# Patient Record
Sex: FEMALE | Race: WHITE | URBAN - METROPOLITAN AREA
[De-identification: names, ages, dates, MRNs, and addresses within clinical notes are randomized per-mention and may not be internally consistent; named-entity substitution may affect disease eponyms.]

---

## 2019-02-21 ENCOUNTER — HISTORICAL (OUTPATIENT)
Dept: ENDOSCOPY | Facility: HOSPITAL | Age: 79
End: 2019-02-21

## 2019-03-18 ENCOUNTER — HISTORICAL (OUTPATIENT)
Dept: ENDOSCOPY | Facility: HOSPITAL | Age: 79
End: 2019-03-18

## 2022-04-30 NOTE — H&P
Patient:   Drea Jean            MRN: [id number]            FIN: [id number]               Age:   78 years     Sex:  Female     :  1940   Associated Diagnoses:   None   Author:   Alexander Martinez MD      Basic Information   Source of history:  Self.       Chief Complaint   78-year-old female with intermittent solid food dysphagia is referred for an EGD.  She had recent EGD roughly 4-6 weeks ago with a tight distal stricture that was tortuous the scope passage.  We elected to dilate her with a Smith dilator 32-34-36 French all with moderate resistance a moderate mucosal tear.  She reports intermediate improvement but is still fairly symptomatic she is back today for repeat dilatation.  She states she's been off her Plavix since last Saturday.      Review of Systems   Constitutional:  Negative.    Cardiovascular:  gerd/dysphagia.       Health Status   Allergies:    Allergic Reactions (Selected)  Severity Not Documented  Lortab- Hives.,    Allergies (1) Active Reaction  Lortab Hives   Current medications:  (Selected)   Documented Medications  Documented  Pantoprazole 40 mg ORAL EC-Tablet: 40 mg = 1 tab(s), Oral, BID, # 60 tab(s), 0 Refill(s)  Plavix 75 mg oral tablet: 75 mg = 1 tab(s), Oral, Daily, # 30 tab(s), 0 Refill(s)  Pravastatin 80 mg Oral Tab: 80 mg = 1 tab(s), Oral, Daily, # 30 tab(s), 0 Refill(s)  Zofran 4 mg oral tablet: 4 mg = 1 tab(s), Oral, q8hr, PRN PRN as needed for nausea/vomiting, 0 Refill(s)  amlodipine 5 mg oral tablet: 5 mg = 1 tab(s), Oral, Daily, # 30 tab(s), 0 Refill(s)  aspirin 81 mg oral Delayed Release (EC) tablet: 81 mg = 1 tab(s), Oral, Daily, # 30 tab(s), 0 Refill(s)  escitalopram 10 mg oral tablet: 10 mg = 1 tab(s), Oral, Daily, # 30 tab(s), 0 Refill(s)  oxyCODONE 15 mg oral tablet: 15 mg = 1 tab(s), Oral, q6hr, PRN PRN for pain, 0 Refill(s)   Problem list:    All Problems  Carotid artery stenosis / SNOMED CT 927020101 / Confirmed  Chronic back pain / SNOMED CT  625326744 / Confirmed  COPD - Chronic obstructive pulmonary disease / SNOMED CT 142810035 / Confirmed  Dysphagia / SNOMED CT 98657037 / Confirmed  GERD - Gastro-esophageal reflux disease / SNOMED CT 0928131797 / Confirmed  Heart disease / SNOMED CT 60627304 / Confirmed  Hypercholesterolaemia / SNOMED CT 594394402 / Confirmed,    Active Problems (7)  Carotid artery stenosis   Chronic back pain   COPD - Chronic obstructive pulmonary disease   Dysphagia   GERD - Gastro-esophageal reflux disease   Heart disease   Hypercholesterolaemia       Histories   Past Medical History:    No active or resolved past medical history items have been selected or recorded.   Family History:    No family history items have been selected or recorded.   Procedure history:    Biopsy Gastrointestinal on 2/21/2019 at 78 Years.  Comments:  2/21/2019 08:Amber Hollis RN  auto-populated from documented surgical case  Bougie Dilatation on 2/21/2019 at 78 Years.  Comments:  2/21/2019 08:Amber Hollis RN  auto-populated from documented surgical case  Esophagogastroduodenoscopy on 2/21/2019 at 78 Years.  Comments:  2/21/2019 08:Amber Hollis RN  auto-populated from documented surgical case  Back fusion ([id number]).  Stent ([id number]).  Comments:  2/21/2019 07:Amber Stone RN  Cardiac Stent  Hysterectomy ([id number]).   Social History        Social & Psychosocial Habits    Tobacco  02/21/2019  Use: 10 or more cigarettes (1/    Patient Wants Consult For Cessation Counseling No.        Physical Examination   General:  Alert and oriented, No acute distress.    Eye:  Pupils are equal, round and reactive to light, Extraocular movements are intact, Normal conjunctiva.    HENT:  Normocephalic, Normal hearing, No pharyngeal erythema.    Neck:  Supple, Non-tender, No lymphadenopathy.    Respiratory:  Lungs are clear to auscultation, Respirations are non-labored, Breath sounds are equal.     Cardiovascular:  Normal rate, Regular rhythm, No murmur.    Gastrointestinal:  Soft, Non-tender, Non-distended, Normal bowel sounds, No organomegaly.       No qualifying data available   Lymphatics:  No lymphadenopathy neck, axilla, groin.    Musculoskeletal:  Normal range of motion, Normal strength, No tenderness, Normal gait.    Integumentary:  Warm, Moist, No rash.    Neurologic:  Alert, Oriented, Normal sensory, Normal motor function, No focal deficits.    Psychiatric:  Cooperative, Appropriate mood & affect.       Health Maintenance      Health Maintenance     Pending (in the next year)        Due            ADL Screening due  03/18/19  and every 1  year(s)           Advance Directive due  03/18/19  and every 1  year(s)           Bone Density Screening due  03/18/19  Variable frequency           COPD Maintenance-Pulmonary Rehab Education due  03/18/19  and every 1  year(s)           COPD Management-COPD Medications Prescribed due  03/18/19  and every 1  year(s)           Cognitive Screening due  03/18/19  and every 1  year(s)           Functional Assessment due  03/18/19  and every 1  year(s)           Geriatric Depression Screening due  03/18/19  and every 1  year(s)           Obesity Screening due  03/18/19  and every 1  year(s)           Pneumococcal Vaccine due  03/18/19  Variable frequency           Smoking Cessation due  03/18/19  Variable frequency           Tetanus Vaccine due  03/18/19  and every 10  year(s)           Zoster Vaccine due  03/18/19  and every 100  year(s)        Due In Future            Fall Risk Assessment not due until  02/21/20  and every 1  year(s)           Aspirin Therapy for CVD Prevention not due until  02/21/20  and every 1  year(s)           COPD Management-Oxygen Assessment not due until  02/21/20  and every 1  year(s)     Satisfied (in the past 1 year)        Satisfied            Aspirin Therapy for CVD Prevention on  02/21/19.           Blood Pressure Screening on   02/21/19.  Satisfied by Edwardo To RN.           COPD Management-Oxygen Assessment on  02/21/19.  Satisfied by Edwardo To RN.           Fall Risk Assessment on  02/21/19.  Satisfied by Amber Lovell RN        Review / Management   Results review:     No qualifying data available.       Impression and Plan   plan for egd with dilation

## 2022-04-30 NOTE — H&P
Patient:   Drea Jean            MRN: [id number]            FIN: [id number]               Age:   78 years     Sex:  Female     :  1940   Associated Diagnoses:   None   Author:   Alexander Martniez MD      Basic Information   Source of history:  Self, Medical record.       Chief Complaint   Extremities 8-year-old female long history of dysphagia has been happening with increasing frequency associated with solid foods.  She has had some weight loss because of decreased by mouth intake.  She's been dilated twice in the past and appears by Dr. wright.  She denies any hematemesis, melena, diarrhea or blood in the stool.  She does admit to some heartburn.  She has been supplementing her caloric intake with ensure but has lost a fair middleweight.  She is on aspirin and Plavix which she states she did not take today.      Review of Systems   Constitutional:  Decreased activity.    Eye:  Negative.    Ear/Nose/Mouth/Throat:  Negative.    Respiratory:  Negative.    Cardiovascular:  Negative.    Gastrointestinal:  Nausea, Vomiting, Dysphagia.    Genitourinary:  Negative.    Hematology/Lymphatics:  Negative.    Endocrine:  Negative.    Immunologic:  Negative.       Health Status   Allergies:    No active allergies have been recorded.,    No qualifying data available   Problem list:    No qualifying data available      Histories   Past Medical History:    No active or resolved past medical history items have been selected or recorded.   Family History:    No family history items have been selected or recorded.   Procedure history:    No active procedure history items have been selected or recorded.   Social History        Social & Psychosocial Habits    No Data Available.        Physical Examination   General:  Alert and oriented, No acute distress, Thin cachectic elderly.    Eye:  Pupils are equal, round and reactive to light, Extraocular movements are intact, Normal conjunctiva.    HENT:  Normocephalic,  Normal hearing, No pharyngeal erythema.    Neck:  Supple, Non-tender, No lymphadenopathy.    Respiratory:  Lungs are clear to auscultation, Respirations are non-labored, Breath sounds are equal.    Cardiovascular:  Normal rate, Regular rhythm, No murmur.    Gastrointestinal:  Soft, Non-tender, Non-distended, Normal bowel sounds, No organomegaly.       No qualifying data available   Lymphatics:  No lymphadenopathy neck, axilla, groin.    Musculoskeletal:  Normal range of motion, Normal strength, No tenderness, Normal gait.    Integumentary:  Warm, Moist, No rash.    Neurologic:  Alert, Oriented, Normal sensory, Normal motor function, No focal deficits.    Psychiatric:  Cooperative, Appropriate mood & affect.       Health Maintenance      Health Maintenance     Pending (in the next year)        Due            ADL Screening due  02/21/19  and every 1  year(s)           Advance Directive due  02/21/19  and every 1  year(s)           Aspirin Therapy for CVD Prevention due  02/21/19  and every 1  year(s)           Bone Density Screening due  02/21/19  Variable frequency           Cognitive Screening due  02/21/19  and every 1  year(s)           Fall Risk Assessment due  02/21/19  and every 1  year(s)           Functional Assessment due  02/21/19  and every 1  year(s)           Geriatric Depression Screening due  02/21/19  and every 1  year(s)           Obesity Screening due  02/21/19  and every 1  year(s)           Pneumococcal Vaccine due  02/21/19  Variable frequency           Tetanus Vaccine due  02/21/19  and every 10  year(s)           Zoster Vaccine due  02/21/19  and every 100  year(s)     Satisfied (in the past 1 year)     There are no satisfied recommendations within the defined date range        Review / Management   Results review:     No qualifying data available.       Impression and Plan   Plan for EGD and likely dilatation recommendations to follow